# Patient Record
Sex: MALE | Race: ASIAN | NOT HISPANIC OR LATINO | URBAN - METROPOLITAN AREA
[De-identification: names, ages, dates, MRNs, and addresses within clinical notes are randomized per-mention and may not be internally consistent; named-entity substitution may affect disease eponyms.]

---

## 2018-01-06 ENCOUNTER — EMERGENCY (EMERGENCY)
Facility: HOSPITAL | Age: 38
LOS: 1 days | Discharge: HOME | End: 2018-01-06
Attending: EMERGENCY MEDICINE
Payer: COMMERCIAL

## 2018-01-06 VITALS
SYSTOLIC BLOOD PRESSURE: 138 MMHG | WEIGHT: 160.06 LBS | HEART RATE: 63 BPM | DIASTOLIC BLOOD PRESSURE: 88 MMHG | TEMPERATURE: 97 F | HEIGHT: 66.93 IN | OXYGEN SATURATION: 100 % | RESPIRATION RATE: 16 BRPM

## 2018-01-06 PROCEDURE — 99283 EMERGENCY DEPT VISIT LOW MDM: CPT | Mod: 25

## 2018-01-06 PROCEDURE — 73620 X-RAY EXAM OF FOOT: CPT

## 2018-01-06 PROCEDURE — 73620 X-RAY EXAM OF FOOT: CPT | Mod: 26,RT

## 2018-01-06 PROCEDURE — 90715 TDAP VACCINE 7 YRS/> IM: CPT

## 2018-01-06 PROCEDURE — 99284 EMERGENCY DEPT VISIT MOD MDM: CPT | Mod: 25

## 2018-01-06 PROCEDURE — 12001 RPR S/N/AX/GEN/TRNK 2.5CM/<: CPT

## 2018-01-06 PROCEDURE — 90471 IMMUNIZATION ADMIN: CPT

## 2018-01-06 RX ORDER — IBUPROFEN 200 MG
600 TABLET ORAL ONCE
Qty: 0 | Refills: 0 | Status: COMPLETED | OUTPATIENT
Start: 2018-01-06 | End: 2018-01-06

## 2018-01-06 RX ORDER — TETANUS TOXOID, REDUCED DIPHTHERIA TOXOID AND ACELLULAR PERTUSSIS VACCINE, ADSORBED 5; 2.5; 8; 8; 2.5 [IU]/.5ML; [IU]/.5ML; UG/.5ML; UG/.5ML; UG/.5ML
0.5 SUSPENSION INTRAMUSCULAR ONCE
Qty: 0 | Refills: 0 | Status: COMPLETED | OUTPATIENT
Start: 2018-01-06 | End: 2018-01-06

## 2018-01-06 RX ADMIN — TETANUS TOXOID, REDUCED DIPHTHERIA TOXOID AND ACELLULAR PERTUSSIS VACCINE, ADSORBED 0.5 MILLILITER(S): 5; 2.5; 8; 8; 2.5 SUSPENSION INTRAMUSCULAR at 19:31

## 2018-01-06 NOTE — ED PROVIDER NOTE - SKIN, MLM
Small 2cm avulsion laceration to the R 5th digit. Not bleeding on examination. No signs of infection.

## 2018-01-06 NOTE — ED PROVIDER NOTE - PRINCIPAL DIAGNOSIS
Laceration of lesser toe of right foot without foreign body present or damage to nail, initial encounter

## 2018-01-06 NOTE — ED PROVIDER NOTE - MEDICAL DECISION MAKING DETAILS
36 y/o M with traumatic toe pain found to have a laceration. Will eval for fracture. pt will get tdap, pain control, suture repair of laceration and reassess.

## 2018-01-06 NOTE — ED ADULT NURSE NOTE - OBJECTIVE STATEMENT
STATES HE HIT RIGHT 5TH TOE AGAINST DOOR TODAY,C/O CUT AND PAIN TO RIGHT 5TH TOE . SEEN AND EXAMINED BY . STATES HE HIT RIGHT 5TH TOE AGAINST DOOR TODAY,C/O CUT AND PAIN TO RIGHT 5TH TOE . SEEN AND EXAMINED BY .RIGHT 5TH TOE LACERATION SUTURED BY  UNDER STERILE TECHNIQUE DSD APPLIED.

## 2018-01-06 NOTE — ED PROVIDER NOTE - OBJECTIVE STATEMENT
36 y/o M pt with no significant PMHx and no significant PSHx presents to the ED c/o pain and laceration to the R 5th digit after a door closed on his R 5th toe. Pt denies numbness, weakness, tingling or any other complaints. Non-smoker. Last tetanus unknown. NKDA.

## 2018-01-06 NOTE — ED PROVIDER NOTE - CARE PLAN
Instructions for follow-up, activity and diet:	Patient will follow up with Podiatry and will go home with orthopedic boot. Principal Discharge DX:	Laceration of lesser toe of right foot without foreign body present or damage to nail, initial encounter  Assessment and plan of treatment:	Patient will follow up with Podiatry and will go home with orthopedic boot.

## 2018-01-06 NOTE — ED PROVIDER NOTE - NS_ ATTENDINGSCRIBEDETAILS _ED_A_ED_FT
The scribe's documentation has been prepared under my direction and personally reviewed by me in its entirety. I confirm that the note above accurately reflects all work, treatment, procedures, and medical decision making performed by me (Dr. Ranulfo Gore).